# Patient Record
Sex: MALE | Race: WHITE | NOT HISPANIC OR LATINO
[De-identification: names, ages, dates, MRNs, and addresses within clinical notes are randomized per-mention and may not be internally consistent; named-entity substitution may affect disease eponyms.]

---

## 2022-01-13 ENCOUNTER — TRANSCRIPTION ENCOUNTER (OUTPATIENT)
Age: 57
End: 2022-01-13

## 2022-04-01 ENCOUNTER — TRANSCRIPTION ENCOUNTER (OUTPATIENT)
Age: 57
End: 2022-04-01

## 2024-01-16 PROBLEM — Z00.00 ENCOUNTER FOR PREVENTIVE HEALTH EXAMINATION: Status: ACTIVE | Noted: 2024-01-16

## 2024-01-19 ENCOUNTER — APPOINTMENT (OUTPATIENT)
Dept: ORTHOPEDIC SURGERY | Facility: CLINIC | Age: 59
End: 2024-01-19
Payer: COMMERCIAL

## 2024-01-19 VITALS — HEIGHT: 69 IN | BODY MASS INDEX: 26.66 KG/M2 | WEIGHT: 180 LBS

## 2024-01-19 DIAGNOSIS — E78.00 PURE HYPERCHOLESTEROLEMIA, UNSPECIFIED: ICD-10-CM

## 2024-01-19 PROCEDURE — 99203 OFFICE O/P NEW LOW 30 MIN: CPT

## 2024-01-19 PROCEDURE — 73010 X-RAY EXAM OF SHOULDER BLADE: CPT | Mod: RT

## 2024-01-19 PROCEDURE — 73030 X-RAY EXAM OF SHOULDER: CPT | Mod: RT

## 2024-01-19 NOTE — IMAGING
[Right] : right shoulder [AC Joint Arthrosis] : AC Joint Arthrosis [de-identified] : Right shoulder No swelling, no ecchymosis, no deformity, no scapular winging. Lateral tenderness to palpation over shoulder, no ttp AC joint or trapezius. Forward flexion: Active 170 degrees Abduction: Active 160 degrees External rotation (with shoulder abducted): Active 90 degrees Internal rotation (with shoulder abducted): Active 30 degrees 4/5 supraspinatus, 5/5 infraspinatus and 4/5 subscapularis. there is pain with strength testing positive Louise test, positive impingement sign, positive Acuña test. Speeds and Yergason negative Motor and sensory intact distally

## 2024-01-19 NOTE — HISTORY OF PRESENT ILLNESS
[Dull/Aching] : dull/aching [Sharp] : sharp [de-identified] : 01/19/2024: Patient presents today for a new injury visit for the R upper arm. States pain started 3-4 months ago after doing upper body workout at [facility]. Has rested and taken nsaids without relief of pain. Denies n/t down the arm. Reports nighttime sx occur.  [] : Post Surgical Visit: no [FreeTextEntry1] : RT upper arm

## 2024-01-19 NOTE — ASSESSMENT
[FreeTextEntry1] : TRAUMATIC RIGHT SHOULDER PAIN S/P INJURY AT THE GYM 3 MONTHS AGO NO IMPROVEMENT WITH REST AND NSAIDS  LIMITED IR AND SOME WEAKNESS ON EXAM TODAY  MRI RIGHT SHOULDER TO EVAL FOR RCT  RTC AFTER MRI TO REVIEW

## 2024-01-23 ENCOUNTER — APPOINTMENT (OUTPATIENT)
Dept: MRI IMAGING | Facility: CLINIC | Age: 59
End: 2024-01-23
Payer: COMMERCIAL

## 2024-01-23 ENCOUNTER — TRANSCRIPTION ENCOUNTER (OUTPATIENT)
Age: 59
End: 2024-01-23

## 2024-01-23 PROCEDURE — 73221 MRI JOINT UPR EXTREM W/O DYE: CPT | Mod: RT

## 2024-01-26 ENCOUNTER — APPOINTMENT (OUTPATIENT)
Dept: ORTHOPEDIC SURGERY | Facility: CLINIC | Age: 59
End: 2024-01-26
Payer: COMMERCIAL

## 2024-01-26 DIAGNOSIS — S43.421A SPRAIN OF RIGHT ROTATOR CUFF CAPSULE, INITIAL ENCOUNTER: ICD-10-CM

## 2024-01-26 DIAGNOSIS — M24.111 OTHER ARTICULAR CARTILAGE DISORDERS, RIGHT SHOULDER: ICD-10-CM

## 2024-01-26 DIAGNOSIS — M25.811 OTHER SPECIFIED JOINT DISORDERS, RIGHT SHOULDER: ICD-10-CM

## 2024-01-26 DIAGNOSIS — M75.01 ADHESIVE CAPSULITIS OF RIGHT SHOULDER: ICD-10-CM

## 2024-01-26 PROCEDURE — 99213 OFFICE O/P EST LOW 20 MIN: CPT

## 2024-01-26 NOTE — HISTORY OF PRESENT ILLNESS
[Dull/Aching] : dull/aching [Sharp] : sharp [de-identified] : 01/19/2024: Patient presents today for a new injury visit for the R upper arm. States pain started 3-4 months ago after doing upper body workout at [facility]. Has rested and taken nsaids without relief of pain. Denies n/t down the arm. Reports nighttime sx occur.   1/26/24: here to follow up on MRI for right shoulder.  [] : This patient has had an injection before: no [FreeTextEntry1] : RT upper arm

## 2024-01-26 NOTE — ASSESSMENT
[FreeTextEntry1] : Reviewed MRI in detail.  Mostly degenerative findings.  He does have a tight posterior capsule on exam today suggesting possibly early frozen shoulder.  The pain is mild.  Course of physical therapy is advised.  He will contact me for a corticosteroid injection if needed.  Follow-up as needed